# Patient Record
Sex: MALE | Race: WHITE
[De-identification: names, ages, dates, MRNs, and addresses within clinical notes are randomized per-mention and may not be internally consistent; named-entity substitution may affect disease eponyms.]

---

## 2019-02-16 ENCOUNTER — HOSPITAL ENCOUNTER (EMERGENCY)
Dept: HOSPITAL 74 - JER | Age: 39
Discharge: HOME | End: 2019-02-16
Payer: COMMERCIAL

## 2019-02-16 VITALS — HEART RATE: 89 BPM | SYSTOLIC BLOOD PRESSURE: 118 MMHG | DIASTOLIC BLOOD PRESSURE: 62 MMHG | TEMPERATURE: 99.4 F

## 2019-02-16 VITALS — BODY MASS INDEX: 42.5 KG/M2

## 2019-02-16 DIAGNOSIS — J06.9: Primary | ICD-10-CM

## 2019-02-16 DIAGNOSIS — R07.89: ICD-10-CM

## 2019-02-16 LAB
ALBUMIN SERPL-MCNC: 3.9 G/DL (ref 3.4–5)
ALP SERPL-CCNC: 74 U/L (ref 45–117)
ALT SERPL-CCNC: 30 U/L (ref 13–61)
ANION GAP SERPL CALC-SCNC: 9 MMOL/L (ref 8–16)
APPEARANCE UR: CLEAR
AST SERPL-CCNC: 20 U/L (ref 15–37)
BASOPHILS # BLD: 0.2 % (ref 0–2)
BILIRUB SERPL-MCNC: 0.6 MG/DL (ref 0.2–1)
BILIRUB UR STRIP.AUTO-MCNC: NEGATIVE MG/DL
BUN SERPL-MCNC: 15 MG/DL (ref 7–18)
CALCIUM SERPL-MCNC: 8.6 MG/DL (ref 8.5–10.1)
CHLORIDE SERPL-SCNC: 103 MMOL/L (ref 98–107)
CO2 SERPL-SCNC: 24 MMOL/L (ref 21–32)
COLOR UR: YELLOW
CREAT SERPL-MCNC: 1 MG/DL (ref 0.55–1.3)
DEPRECATED RDW RBC AUTO: 13.9 % (ref 11.9–15.9)
EOSINOPHIL # BLD: 0.3 % (ref 0–4.5)
GLUCOSE SERPL-MCNC: 84 MG/DL (ref 74–106)
HCT VFR BLD CALC: 42.4 % (ref 35.4–49)
HGB BLD-MCNC: 14.7 GM/DL (ref 11.7–16.9)
INR BLD: 1.07 (ref 0.83–1.09)
KETONES UR QL STRIP: NEGATIVE
LEUKOCYTE ESTERASE UR QL STRIP.AUTO: NEGATIVE
LYMPHOCYTES # BLD: 4.6 % (ref 8–40)
MAGNESIUM SERPL-MCNC: 1.8 MG/DL (ref 1.8–2.4)
MCH RBC QN AUTO: 32 PG (ref 25.7–33.7)
MCHC RBC AUTO-ENTMCNC: 34.6 G/DL (ref 32–35.9)
MCV RBC: 92.5 FL (ref 80–96)
MONOCYTES # BLD AUTO: 4.2 % (ref 3.8–10.2)
NEUTROPHILS # BLD: 90.7 % (ref 42.8–82.8)
NITRITE UR QL STRIP: NEGATIVE
PH UR: 5 [PH] (ref 5–8)
PLATELET # BLD AUTO: 333 K/MM3 (ref 134–434)
PMV BLD: 8.4 FL (ref 7.5–11.1)
POTASSIUM SERPLBLD-SCNC: 4.1 MMOL/L (ref 3.5–5.1)
PROT SERPL-MCNC: 7.7 G/DL (ref 6.4–8.2)
PROT UR QL STRIP: NEGATIVE
PROT UR QL STRIP: NEGATIVE
PT PNL PPP: 12.6 SEC (ref 9.7–13)
RBC # BLD AUTO: 4.58 M/MM3 (ref 4–5.6)
SODIUM SERPL-SCNC: 137 MMOL/L (ref 136–145)
SP GR UR: 1.02 (ref 1.01–1.03)
UROBILINOGEN UR STRIP-MCNC: NEGATIVE MG/DL (ref 0.2–1)
WBC # BLD AUTO: 13.4 K/MM3 (ref 4–10)

## 2019-02-16 PROCEDURE — 3E033NZ INTRODUCTION OF ANALGESICS, HYPNOTICS, SEDATIVES INTO PERIPHERAL VEIN, PERCUTANEOUS APPROACH: ICD-10-PCS | Performed by: EMERGENCY MEDICINE

## 2019-02-16 NOTE — PDOC
History of Present Illness





- General


Chief Complaint: Weakness


Stated Complaint: WEAKNESS, SOB


Time Seen by Provider: 02/16/19 05:42


History Source: Patient


Exam Limitations: No Limitations





- History of Present Illness


Initial Comments: 





02/16/19 06:31


Patient is a 38M with history of obesity here today complaining of epigastric 

abdominal pain, shortness of breath, fevers, chills, and chest pain that 

started at 7pm yesterday. Patient denies sick contacts, states that he already 

had the flu this year. States that his abdominal pain improved with pepto 

bismol. He states that his chest pain is located substernally and both his cp 

and sob are worsened with exertion. Denies history of blood clots, leg swelling.





Past History





- Past Medical History


Allergies/Adverse Reactions: 


 Allergies











Allergy/AdvReac Type Severity Reaction Status Date / Time


 


No Known Allergies Allergy   Verified 02/16/19 05:45











Home Medications: 


Ambulatory Orders





NK [No Known Home Medication]  02/16/19 











- Suicide/Smoking/Psychosocial Hx


Smoking History: Never smoked


Have you smoked in the past 12 months: No


Information on smoking cessation initiated: No


Hx Alcohol Use: No


Drug/Substance Use Hx: No





**Review of Systems





- Review of Systems


Comments:: 





02/16/19 06:34


GENERAL/CONSTITUTIONAL: +fever +chills. No weakness.


HEAD, EYES, EARS, NOSE AND THROAT: No change in vision.  No sore throat.


CARDIOVASCULAR: +chest pain +shortness of breath


RESPIRATORY: +cough, no wheezing, or hemoptysis.


GASTROINTESTINAL: No nausea, vomiting, diarrhea or constipation.


GENITOURINARY: No dysuria, frequency, or change in urination.


MUSCULOSKELETAL: +bodyaches. No neck or back pain.


SKIN: No rash


NEUROLOGIC: No headache, vertigo, loss of consciousness, or change in strength/

sensation.


ENDOCRINE: No increased thirst. No abnormal weight change


HEMATOLOGIC/LYMPHATIC: No anemia, easy bleeding, or history of blood clots.


ALLERGIC/IMMUNOLOGIC: No hives or skin allergy.





*Physical Exam





- Vital Signs


 Last Vital Signs











Temp Pulse Resp BP Pulse Ox


 


 101.0 F H  106 H  20   130/81   97 


 


 02/16/19 05:45  02/16/19 05:45  02/16/19 05:45  02/16/19 05:45  02/16/19 05:45














- Physical Exam


Comments: 





02/16/19 06:35


GENERAL: Awake, alert, and fully oriented, in no acute distress


HEAD: No signs of trauma, normocephalic, atraumatic


EYES: PERRLA, EOMI, sclera anicteric, conjunctiva clear 


ENT: Auricles normal inspection, hearing grossly normal, nares patent, 

oropharynx clear without exudates. Moist mucosa


NECK: Normal ROM, supple, no lymphadenopathy, JVD, or masses


LUNGS: No distress, speaks full sentences, clear to auscultation bilaterally


HEART: Regular rate and rhythm, normal S1 and S2, no murmurs, rubs or gallops, 

peripheral pulses normal and equal bilaterally.


ABDOMEN: Soft, nontender, normoactive bowel sounds.  No guarding, no rebound.  

No masses


EXTREMITIES: Normal inspection, Normal range of motion, no edema.  No clubbing 

or cyanosis.


NEUROLOGICAL: Cranial nerves II through XII grossly intact.  Normal speech, 

normal gait, no focal sensorimotor deficits


SKIN: Warm, Dry, normal turgor, no rashes or lesions noted.





Moderate Sedation





- Procedure Monitoring


Vital Signs: 


Procedure Monitoring Vital Signs











Temperature  101.0 F H  02/16/19 05:45


 


Pulse Rate  106 H  02/16/19 05:45


 


Respiratory Rate  20   02/16/19 05:45


 


Blood Pressure  130/81   02/16/19 05:45


 


O2 Sat by Pulse Oximetry (%)  97   02/16/19 05:45











ED Treatment Course





- LABORATORY


CBC & Chemistry Diagram: 


 02/16/19 05:50





 02/16/19 05:50





- Medications


Given in the ED: 


ED Medications














Discontinued Medications














Generic Name Dose Route Start Last Admin





  Trade Name Michele  PRN Reason Stop Dose Admin


 


Acetaminophen  1,000 mg  02/16/19 05:43  02/16/19 05:56





  Ofirmev Injection -  IVPB  02/16/19 05:44  1,000 mg





  ONCE ONE   Administration





     





     





     





     














Medical Decision Making





- Medical Decision Making





02/16/19 06:35


Patient is 38M here today with chest pain, fever. Vital signs notable for 

tachycardia and fever. Not ill appearing. DDx includes, but is not limited to: 

pneumonia, bronchitis, acs. Will evaluate with cbc, cmp, trop, ekg, cxr, ua/uc. 

Will treat with tylenol, fluids.


02/16/19 07:13


CBC shows leukocytosis. CMP pending. CXR shows no obvious infiltrate. Pending re

-eval, cmp/trop, EKG.





Signed out to Dr Pinto.





*DC/Admit/Observation/Transfer


Diagnosis at time of Disposition: 


 Fever, Chest pain








- Discharge Dispostion


Condition at time of disposition: Stable





- Referrals


Referrals: 


ON STAFF,NOT [Primary Care Provider] - 





- Patient Instructions





- Post Discharge Activity

## 2019-02-16 NOTE — EKG
Test Reason : 

Blood Pressure : ***/*** mmHG

Vent. Rate : 089 BPM     Atrial Rate : 089 BPM

   P-R Int : 148 ms          QRS Dur : 086 ms

    QT Int : 368 ms       P-R-T Axes : 051 -09 025 degrees

   QTc Int : 447 ms

 

NORMAL SINUS RHYTHM

POSSIBLE LEFT ATRIAL ENLARGEMENT

WHEN COMPARED WITH ECG OF 05-OCT-2003 18:27,

QT HAS LENGTHENED

Confirmed by GAINFRANCO COLMENARES MD ([id number]) on 2/16/2019 12:54:50 PM

 

Referred By:             Confirmed By:GIANFRANCO COLMENARES MD

## 2019-02-16 NOTE — PDOC
*Physical Exam





- Vital Signs


 Last Vital Signs











Temp Pulse Resp BP Pulse Ox


 


 101.0 F H  106 H  20   130/81   97 


 


 02/16/19 05:45  02/16/19 05:45  02/16/19 05:45  02/16/19 05:45  02/16/19 05:45














- Physical Exam


Comments: 





02/16/19 07:58


GEN: A&O, NO acute distress


HEENT: PERRL, moist mucus membranes, no pharyngeal or nasal exudate or erythema


NECK: supple, no lymphadenopathy


HEART: RRR no murmurs, no longer tachycardic


LUNGS: CTA b/l, no wheezes or rhonchi


ABDOMEN: Soft, nontender


ESTREMITIES: No peripheral edema or calf tenderness





ED Treatment Course





- LABORATORY


CBC & Chemistry Diagram: 


 02/16/19 05:50





 02/16/19 05:50





- ADDITIONAL ORDERS


Additional order review: 


 Laboratory  Results











  02/16/19 02/16/19





  05:50 05:50


 


PT with INR   12.60


 


INR   1.07


 


Urine Color  Yellow 


 


Urine Appearance  Clear 


 


Urine pH  5.0 


 


Ur Specific Gravity  1.023 


 


Urine Protein  Negative 


 


Urine Glucose (UA)  Negative 


 


Urine Ketones  Negative 


 


Urine Blood  Negative 


 


Urine Nitrite  Negative 


 


Urine Bilirubin  Negative 


 


Urine Urobilinogen  Negative 


 


Ur Leukocyte Esterase  Negative 








 











  02/16/19





  05:50


 


RBC  4.58


 


MCV  92.5


 


MCHC  34.6


 


RDW  13.9


 


MPV  8.4


 


Neutrophils %  90.7 H


 


Lymphocytes %  4.6 L


 


Monocytes %  4.2


 


Eosinophils %  0.3


 


Basophils %  0.2














- Medications


Given in the ED: 


ED Medications














Discontinued Medications














Generic Name Dose Route Start Last Admin





  Trade Name Eastonq  PRN Reason Stop Dose Admin


 


Acetaminophen  1,000 mg  02/16/19 05:43  02/16/19 05:56





  Ofirmev Injection -  IVPB  02/16/19 05:44  1,000 mg





  ONCE ONE   Administration





     





     





     





     


 


Sodium Chloride  1,000 mls @ 1,000 mls/hr  02/16/19 05:43  02/16/19 05:55





  Normal Saline -  IV  02/16/19 06:42  1,000 mls/hr





  ASDIR STA   Administration





     





     





     





     














Medical Decision Making





- Medical Decision Making





02/16/19 07:59


Pt received from night resident. WBC count noted with mild elevation, however 

patient states he is feeling much better than on arrival. Temp down to 99.4. 

CXR noted without any infiltrate. Likely viral URI. Pt states he feels well 

enough to go home. Will encourage to drink plenty of fluids, and continue 

tylenol as needed for any myalgias/fever.





*DC/Admit/Observation/Transfer


Diagnosis at time of Disposition: 


 Fever, Chest pain








- Discharge Dispostion


Disposition: HOME


Condition at time of disposition: Stable


Decision to Admit order: No





- Referrals


Referrals: 


ON STAFF,NOT [Primary Care Provider] - 





- Patient Instructions


Printed Discharge Instructions:  DI for Viral Upper Respiratory Infection -- 

Adult


Additional Instructions: 


You were seen in the Emergency room with complaint of cough, fever, and some 

chest pain. All of your lab work resulted without any concerning findings and 

your EKG was not concerning for any heart attack. A chest Xray was done which 

did not reveal any pneumonia. As you are now feeling better, you are safe to go 

home at this time.





You should follow up with your doctor within one week.


You should continue to take all of your home medications as they are prescribed 

to you by your doctor.





You should be seen by your doctor or return to the emergency room if your 

symptoms worsen or you have any other concerning symptoms. 





- Post Discharge Activity

## 2019-02-16 NOTE — PDOC
Attending Attestation





- Resident


Resident Name: Neel Borja





- ED Attending Attestation


I have performed the following: I have examined & evaluated the patient, The 

case was reviewed & discussed with the resident, I agree w/resident's findings 

& plan





- HPI


HPI: 





02/16/19 07:20


Pt comes with fever and weakness and an episode of diarrhea.  No cough.  He 

wants to make sure that he has no influenza.  Pt is flu negative.


Pt also states that he has no sick contacts at work or at home.





- Physicial Exam


PE: 





02/16/19 07:21


Agree with resident exam. 


Pt has fever.  Pt has clear lungs.  Abd soft NT ND.


No rashes or shingles.  HEENT normal.


No flank pain





- Medical Decision Making





02/16/19 07:22


Pt will be igned out to the day team.  Pt is flu negative; UA normal; CBC 

normal. WBC slight elevation.


02/16/19 07:23


Once pt defervesces, he is stable for d/c home.


COMP normal also





**Heart Score/ECG Review





- ECG Intrepretation


Rhythm: Regular Rhythm





- Axis


Axis: Normal





- P and SD


Atrial Enlargement: Left


Prominent R with upright T in V1 (true posterior MI): No


Delta Wave(s) Present: No


WPW: No





- QRS


Poor R Wave Progression: No


Q Wave Present: No





- ST and T


Early Repolarization: No


Non Specific ST-T Wave changes: No


Flattened T Waves: No


Prolonged Q-T Interval: No





- ECG Impressions


Normal ECG: Yes


Non-specific ST Elevation: No


Ischemic Changes: No


Bradycardia: No


Torsades maid Pointes: No


WPW: No